# Patient Record
Sex: MALE | Race: WHITE | NOT HISPANIC OR LATINO | Employment: FULL TIME | URBAN - METROPOLITAN AREA
[De-identification: names, ages, dates, MRNs, and addresses within clinical notes are randomized per-mention and may not be internally consistent; named-entity substitution may affect disease eponyms.]

---

## 2019-07-30 ENCOUNTER — HOSPITAL ENCOUNTER (EMERGENCY)
Facility: HOSPITAL | Age: 24
Discharge: HOME | End: 2019-07-31
Admitting: EMERGENCY MEDICINE

## 2019-07-30 VITALS
DIASTOLIC BLOOD PRESSURE: 79 MMHG | OXYGEN SATURATION: 96 % | HEART RATE: 114 BPM | BODY MASS INDEX: 27.65 KG/M2 | SYSTOLIC BLOOD PRESSURE: 117 MMHG | RESPIRATION RATE: 18 BRPM | TEMPERATURE: 99.7 F | HEIGHT: 67 IN | WEIGHT: 176.15 LBS

## 2019-07-30 DIAGNOSIS — J01.00 ACUTE NON-RECURRENT MAXILLARY SINUSITIS: Primary | ICD-10-CM

## 2019-07-30 PROCEDURE — 99282 EMERGENCY DEPT VISIT SF MDM: CPT

## 2019-07-31 ENCOUNTER — APPOINTMENT (OUTPATIENT)
Dept: GENERAL RADIOLOGY | Facility: HOSPITAL | Age: 24
End: 2019-07-31

## 2019-07-31 PROCEDURE — 71046 X-RAY EXAM CHEST 2 VIEWS: CPT

## 2019-07-31 RX ORDER — BROMPHENIRAMINE MALEATE, PSEUDOEPHEDRINE HYDROCHLORIDE, AND DEXTROMETHORPHAN HYDROBROMIDE 2; 30; 10 MG/5ML; MG/5ML; MG/5ML
5 SYRUP ORAL 4 TIMES DAILY PRN
Qty: 118 ML | Refills: 0 | Status: SHIPPED | OUTPATIENT
Start: 2019-07-31 | End: 2020-02-07

## 2019-07-31 RX ORDER — AMOXICILLIN 875 MG/1
875 TABLET, COATED ORAL 2 TIMES DAILY
Qty: 14 TABLET | Refills: 0 | Status: SHIPPED | OUTPATIENT
Start: 2019-07-31 | End: 2020-02-07

## 2020-02-07 ENCOUNTER — HOSPITAL ENCOUNTER (EMERGENCY)
Facility: HOSPITAL | Age: 25
Discharge: HOME | End: 2020-02-07
Admitting: EMERGENCY MEDICINE

## 2020-02-07 VITALS
DIASTOLIC BLOOD PRESSURE: 73 MMHG | OXYGEN SATURATION: 99 % | TEMPERATURE: 98.1 F | WEIGHT: 163.14 LBS | SYSTOLIC BLOOD PRESSURE: 118 MMHG | HEART RATE: 107 BPM | BODY MASS INDEX: 25.61 KG/M2 | HEIGHT: 67 IN | RESPIRATION RATE: 20 BRPM

## 2020-02-07 DIAGNOSIS — J02.0 STREP PHARYNGITIS: Primary | ICD-10-CM

## 2020-02-07 LAB
FLUAV SUBTYP SPEC NAA+PROBE: NOT DETECTED
FLUBV RNA ISLT QL NAA+PROBE: NOT DETECTED
S PYO AG THROAT QL: POSITIVE

## 2020-02-07 PROCEDURE — 87502 INFLUENZA DNA AMP PROBE: CPT

## 2020-02-07 PROCEDURE — 87651 STREP A DNA AMP PROBE: CPT

## 2020-02-07 PROCEDURE — 99283 EMERGENCY DEPT VISIT LOW MDM: CPT

## 2020-02-07 RX ORDER — AMOXICILLIN 875 MG/1
875 TABLET, COATED ORAL 2 TIMES DAILY
Qty: 20 TABLET | Refills: 0 | Status: SHIPPED | OUTPATIENT
Start: 2020-02-07 | End: 2020-02-17

## 2020-02-08 NOTE — ED PROVIDER NOTES
Subjective   Patient is a 24-year-old white male with no significant medical history who presents today with complaints of cough congestion sore throat fever chills and body aches.  He states his symptoms started 2 days ago.  Denies any chest pain or abdominal pain.  He denies any shortness of breath.  He does report nausea with a few episodes of vomiting.  He denies any diarrhea.  He denies any ill contacts or recent travel.          Review of Systems   Constitutional: Positive for chills and fever.   HENT: Positive for congestion, rhinorrhea and sore throat.    Respiratory: Positive for cough. Negative for shortness of breath.    Cardiovascular: Negative for chest pain.   Gastrointestinal: Positive for nausea and vomiting. Negative for abdominal pain and diarrhea.   Genitourinary: Negative for decreased urine volume.   Skin: Negative for rash.       Past Medical History:   Diagnosis Date   • Anxiety    • Depression        No Known Allergies    Past Surgical History:   Procedure Laterality Date   • TONSILLECTOMY         History reviewed. No pertinent family history.    Social History     Socioeconomic History   • Marital status: Single     Spouse name: Not on file   • Number of children: Not on file   • Years of education: Not on file   • Highest education level: Not on file   Tobacco Use   • Smoking status: Current Every Day Smoker     Packs/day: 2.00     Years: 13.00     Pack years: 26.00     Types: Cigarettes   • Smokeless tobacco: Never Used   Substance and Sexual Activity   • Alcohol use: Not Currently   • Drug use: Never   • Sexual activity: Defer           Objective   Physical Exam   Constitutional: He appears well-developed.   Vital signs and triage nurse note reviewed.  Constitutional: Awake, alert; well-developed and well-nourished. No acute distress is noted.  HEENT: Normocephalic, atraumatic; pupils are PERRL with intact EOM; oropharynx is erythematous and moist without exudate.  No drooling or pooling  of oral secretions.  Uvula is midline.  Neck: Supple, full range of motion without pain; anterior cervical lymphadenopathy. Normal phonation.  Cardiovascular: Regular rate and rhythm, normal S1-S2.  No murmur noted.  Pulmonary: Respiratory effort regular nonlabored, breath sounds clear to auscultation all fields.  Abdomen: Soft, nontender, nondistended with normoactive bowel sounds; no rebound or guarding.  Musculoskeletal: Independent range of motion of all extremities with no palpable tenderness or edema.  Neuro: Alert oriented x3, speech is clear and appropriate, GCS 15.    Skin: Flesh tone, warm, dry, intact; no erythematous or petechial rash or lesion.        Procedures           ED Course                                               MDM  Number of Diagnoses or Management Options  Diagnosis management comments: Comorbidities: None  Differentials: Strep, flu, viral illness;this list is not all inclusive and does not constitute the entirety of considered causes  Discussion with provider:  Radiology interpretation: X-rays reviewed by me and interpreted by radiologist: Not warranted  Lab interpretation: Labs viewed by me significant for: As above    Patient had flu and strep swab obtained.    Diagnosis and treatment plan discussed with patient.  Patient agreeable to plan.   I discussed findings with patient who voices understanding of discharge instructions, signs and symptoms requiring return to ED; discharged improved and in stable condition with follow up for re-evaluation.  Prescription for amoxicillin.         Amount and/or Complexity of Data Reviewed  Clinical lab tests: ordered and reviewed    Patient Progress  Patient progress: stable      Final diagnoses:   Strep pharyngitis            Rena Sampson, SRINIVAS  02/07/20 2040

## 2020-02-08 NOTE — DISCHARGE INSTRUCTIONS
Take medication as prescribed.  Drink plenty fluids.  Use Tylenol ibuprofen for pain or fever.  Warm salt water gargles.  Chloraseptic throat spray or Cepacol lozenges.  Change your toothbrush in 2 days.  Follow-up with primary care physician as needed.  Return for new or worsening symptoms.

## 2020-03-11 ENCOUNTER — HOSPITAL ENCOUNTER (EMERGENCY)
Facility: HOSPITAL | Age: 25
Discharge: HOME | End: 2020-03-12
Admitting: EMERGENCY MEDICINE

## 2020-03-11 DIAGNOSIS — M54.6 BILATERAL THORACIC BACK PAIN, UNSPECIFIED CHRONICITY: Primary | ICD-10-CM

## 2020-03-11 PROCEDURE — 99282 EMERGENCY DEPT VISIT SF MDM: CPT

## 2020-03-12 VITALS
HEIGHT: 67 IN | OXYGEN SATURATION: 98 % | HEART RATE: 69 BPM | DIASTOLIC BLOOD PRESSURE: 77 MMHG | SYSTOLIC BLOOD PRESSURE: 111 MMHG | TEMPERATURE: 98.5 F | BODY MASS INDEX: 25.99 KG/M2 | RESPIRATION RATE: 18 BRPM | WEIGHT: 165.57 LBS
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
## 2020-04-20 ENCOUNTER — OFFICE VISIT (OUTPATIENT)
Dept: FAMILY MEDICINE CLINIC | Facility: CLINIC | Age: 25
End: 2020-04-20

## 2020-04-20 ENCOUNTER — LAB (OUTPATIENT)
Dept: FAMILY MEDICINE CLINIC | Facility: CLINIC | Age: 25
End: 2020-04-20

## 2020-04-20 VITALS
DIASTOLIC BLOOD PRESSURE: 82 MMHG | BODY MASS INDEX: 25.74 KG/M2 | TEMPERATURE: 98.2 F | SYSTOLIC BLOOD PRESSURE: 127 MMHG | OXYGEN SATURATION: 98 % | HEIGHT: 67 IN | WEIGHT: 164 LBS | HEART RATE: 86 BPM

## 2020-04-20 DIAGNOSIS — R63.1 POLYDIPSIA: ICD-10-CM

## 2020-04-20 DIAGNOSIS — R35.89 POLYURIA: ICD-10-CM

## 2020-04-20 DIAGNOSIS — R20.2 NUMBNESS AND TINGLING: ICD-10-CM

## 2020-04-20 DIAGNOSIS — F41.9 ANXIETY: ICD-10-CM

## 2020-04-20 DIAGNOSIS — F32.0 CURRENT MILD EPISODE OF MAJOR DEPRESSIVE DISORDER WITHOUT PRIOR EPISODE (HCC): ICD-10-CM

## 2020-04-20 DIAGNOSIS — S22.080A COMPRESSION FRACTURE OF T11 VERTEBRA, INITIAL ENCOUNTER (HCC): Primary | ICD-10-CM

## 2020-04-20 DIAGNOSIS — R20.0 NUMBNESS AND TINGLING: ICD-10-CM

## 2020-04-20 LAB
ALBUMIN SERPL-MCNC: 4.6 G/DL (ref 3.5–5.2)
ALBUMIN/GLOB SERPL: 1.5 G/DL
ALP SERPL-CCNC: 68 U/L (ref 39–117)
ALT SERPL W P-5'-P-CCNC: 36 U/L (ref 1–41)
ANION GAP SERPL CALCULATED.3IONS-SCNC: 12.8 MMOL/L (ref 5–15)
AST SERPL-CCNC: 18 U/L (ref 1–40)
BASOPHILS # BLD AUTO: 0.03 10*3/MM3 (ref 0–0.2)
BASOPHILS NFR BLD AUTO: 0.3 % (ref 0–1.5)
BILIRUB SERPL-MCNC: 1 MG/DL (ref 0.2–1.2)
BUN BLD-MCNC: 8 MG/DL (ref 6–20)
BUN/CREAT SERPL: 9.6 (ref 7–25)
CALCIUM SPEC-SCNC: 9.7 MG/DL (ref 8.6–10.5)
CHLORIDE SERPL-SCNC: 100 MMOL/L (ref 98–107)
CO2 SERPL-SCNC: 26.2 MMOL/L (ref 22–29)
CREAT BLD-MCNC: 0.83 MG/DL (ref 0.76–1.27)
DEPRECATED RDW RBC AUTO: 39.6 FL (ref 37–54)
EOSINOPHIL # BLD AUTO: 0.06 10*3/MM3 (ref 0–0.4)
EOSINOPHIL NFR BLD AUTO: 0.7 % (ref 0.3–6.2)
ERYTHROCYTE [DISTWIDTH] IN BLOOD BY AUTOMATED COUNT: 12.5 % (ref 12.3–15.4)
GFR SERPL CREATININE-BSD FRML MDRD: 112 ML/MIN/1.73
GLOBULIN UR ELPH-MCNC: 3 GM/DL
GLUCOSE BLD-MCNC: 123 MG/DL (ref 65–99)
HBA1C MFR BLD: 5.4 % (ref 3.5–5.6)
HCT VFR BLD AUTO: 44.6 % (ref 37.5–51)
HGB BLD-MCNC: 15.6 G/DL (ref 13–17.7)
IMM GRANULOCYTES # BLD AUTO: 0.02 10*3/MM3 (ref 0–0.05)
IMM GRANULOCYTES NFR BLD AUTO: 0.2 % (ref 0–0.5)
LYMPHOCYTES # BLD AUTO: 2.39 10*3/MM3 (ref 0.7–3.1)
LYMPHOCYTES NFR BLD AUTO: 27.3 % (ref 19.6–45.3)
MCH RBC QN AUTO: 30.6 PG (ref 26.6–33)
MCHC RBC AUTO-ENTMCNC: 35 G/DL (ref 31.5–35.7)
MCV RBC AUTO: 87.6 FL (ref 79–97)
MONOCYTES # BLD AUTO: 0.58 10*3/MM3 (ref 0.1–0.9)
MONOCYTES NFR BLD AUTO: 6.6 % (ref 5–12)
NEUTROPHILS # BLD AUTO: 5.68 10*3/MM3 (ref 1.7–7)
NEUTROPHILS NFR BLD AUTO: 64.9 % (ref 42.7–76)
NRBC BLD AUTO-RTO: 0 /100 WBC (ref 0–0.2)
PLATELET # BLD AUTO: 295 10*3/MM3 (ref 140–450)
PMV BLD AUTO: 10.4 FL (ref 6–12)
POTASSIUM BLD-SCNC: 4.1 MMOL/L (ref 3.5–5.2)
PROT SERPL-MCNC: 7.6 G/DL (ref 6–8.5)
RBC # BLD AUTO: 5.09 10*6/MM3 (ref 4.14–5.8)
SODIUM BLD-SCNC: 139 MMOL/L (ref 136–145)
VIT B12 BLD-MCNC: 395 PG/ML (ref 211–946)
WBC NRBC COR # BLD: 8.76 10*3/MM3 (ref 3.4–10.8)

## 2020-04-20 PROCEDURE — 82652 VIT D 1 25-DIHYDROXY: CPT | Performed by: NURSE PRACTITIONER

## 2020-04-20 PROCEDURE — 99204 OFFICE O/P NEW MOD 45 MIN: CPT | Performed by: NURSE PRACTITIONER

## 2020-04-20 PROCEDURE — 36415 COLL VENOUS BLD VENIPUNCTURE: CPT | Performed by: NURSE PRACTITIONER

## 2020-04-20 PROCEDURE — 80053 COMPREHEN METABOLIC PANEL: CPT | Performed by: NURSE PRACTITIONER

## 2020-04-20 PROCEDURE — 82607 VITAMIN B-12: CPT | Performed by: NURSE PRACTITIONER

## 2020-04-20 PROCEDURE — 83036 HEMOGLOBIN GLYCOSYLATED A1C: CPT | Performed by: NURSE PRACTITIONER

## 2020-04-20 PROCEDURE — 85025 COMPLETE CBC W/AUTO DIFF WBC: CPT | Performed by: NURSE PRACTITIONER

## 2020-04-20 RX ORDER — ESCITALOPRAM OXALATE 10 MG/1
10 TABLET ORAL DAILY
Qty: 30 TABLET | Refills: 3 | Status: SHIPPED | OUTPATIENT
Start: 2020-04-20 | End: 2020-10-26

## 2020-04-20 RX ORDER — HYDROXYZINE HYDROCHLORIDE 25 MG/1
25 TABLET, FILM COATED ORAL EVERY 6 HOURS PRN
Qty: 60 TABLET | Refills: 1 | Status: SHIPPED | OUTPATIENT
Start: 2020-04-20
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
## 2020-04-22 LAB — 1,25(OH)2D3 SERPL-MCNC: 27.4 PG/ML (ref 19.9–79.3)

## 2020-04-29 ENCOUNTER — OFFICE VISIT (OUTPATIENT)
Dept: NEUROSURGERY | Facility: CLINIC | Age: 25
End: 2020-04-29

## 2020-04-29 VITALS
SYSTOLIC BLOOD PRESSURE: 137 MMHG | HEART RATE: 76 BPM | DIASTOLIC BLOOD PRESSURE: 74 MMHG | BODY MASS INDEX: 26.06 KG/M2 | WEIGHT: 166 LBS | HEIGHT: 67 IN

## 2020-04-29 DIAGNOSIS — S22.088A OTHER CLOSED FRACTURE OF ELEVENTH THORACIC VERTEBRA, INITIAL ENCOUNTER (HCC): ICD-10-CM

## 2020-04-29 DIAGNOSIS — M54.6 PAIN IN THORACIC SPINE: Primary | ICD-10-CM

## 2020-04-29 PROCEDURE — 99203 OFFICE O/P NEW LOW 30 MIN: CPT | Performed by: NEUROLOGICAL SURGERY

## 2020-04-29 RX ORDER — GABAPENTIN 300 MG/1
300 CAPSULE ORAL NIGHTLY
Qty: 120 CAPSULE | Refills: 2 | Status: SHIPPED | OUTPATIENT
Start: 2020-04-29
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
## 2020-06-01 PROBLEM — S22.079D: Status: ACTIVE | Noted: 2020-06-01

## 2020-06-08 ENCOUNTER — HOSPITAL ENCOUNTER (EMERGENCY)
Facility: HOSPITAL | Age: 25
Discharge: HOME | End: 2020-06-08
Attending: EMERGENCY MEDICINE | Admitting: EMERGENCY MEDICINE

## 2020-06-08 VITALS
WEIGHT: 188.27 LBS | RESPIRATION RATE: 16 BRPM | DIASTOLIC BLOOD PRESSURE: 87 MMHG | OXYGEN SATURATION: 98 % | HEIGHT: 67 IN | BODY MASS INDEX: 29.55 KG/M2 | TEMPERATURE: 98.2 F | HEART RATE: 71 BPM | SYSTOLIC BLOOD PRESSURE: 124 MMHG

## 2020-06-08 DIAGNOSIS — H60.332 ACUTE SWIMMER'S EAR OF LEFT SIDE: Primary | ICD-10-CM

## 2020-06-08 DIAGNOSIS — R42 VERTIGO: ICD-10-CM

## 2020-06-08 PROCEDURE — 99282 EMERGENCY DEPT VISIT SF MDM: CPT

## 2020-06-08 RX ORDER — CIPROFLOXACIN AND DEXAMETHASONE 3; 1 MG/ML; MG/ML
4 SUSPENSION/ DROPS AURICULAR (OTIC) 2 TIMES DAILY
Qty: 7.5 ML | Refills: 0 | Status: SHIPPED | OUTPATIENT
Start: 2020-06-08

## 2020-06-08 RX ORDER — TRAMADOL HYDROCHLORIDE 50 MG/1
50 TABLET ORAL EVERY 6 HOURS PRN
Qty: 10 TABLET | Refills: 0 | Status: SHIPPED | OUTPATIENT
Start: 2020-06-08

## 2020-06-08 RX ORDER — MECLIZINE HYDROCHLORIDE 25 MG/1
25 TABLET ORAL 3 TIMES DAILY PRN
Qty: 15 TABLET | Refills: 0 | Status: SHIPPED | OUTPATIENT
Start: 2020-06-08

## 2020-06-08 NOTE — ED PROVIDER NOTES
Subjective   24-year-old male complaining of pain in his left ear.  He states his been swimming a lot over the last several days.  He reports he has had no definite fever or chills but felt hot.  He reports no nausea vomiting or diarrhea.  He states he had some vertigo earlier this evening          Review of Systems   HENT: Positive for ear discharge and ear pain. Negative for nosebleeds, sore throat and trouble swallowing.    Hematological: Does not bruise/bleed easily.       Past Medical History:   Diagnosis Date   • Anxiety    • Depression    • History of suicidal ideation    • Mid back pain    • Recovering alcoholic (CMS/HCC)        No Known Allergies    Past Surgical History:   Procedure Laterality Date   • TONSILLECTOMY     • TOOTH EXTRACTION         Family History   Problem Relation Age of Onset   • Diabetes Maternal Grandfather    • Hypertension Maternal Grandfather    • Stroke Mother    • Hypertension Mother        Social History     Socioeconomic History   • Marital status: Single     Spouse name: Not on file   • Number of children: Not on file   • Years of education: Not on file   • Highest education level: Not on file   Tobacco Use   • Smoking status: Current Every Day Smoker     Packs/day: 2.00     Years: 13.00     Pack years: 26.00     Types: Cigarettes   • Smokeless tobacco: Current User     Types: Chew   Substance and Sexual Activity   • Alcohol use: Not Currently   • Drug use: Never   • Sexual activity: Defer           Objective   Physical Exam  Alert Beatriz Coma Scale 15   HEENT: Pupils equal and reactive to light. Conjunctivae are not injected. normal tympanic membrane on the right.  There is some mild erythema noted on the left appears to be reactive and there is good mobility.  The patient does have erythema and yellow drainage noted in the canal on the left.. Oropharynx and nares are normal.   Neck: Supple. Midline trachea. No JVD. No goiter.   Chest: Clear and equal breath sounds bilaterally  regular rate and rhythm without murmur or rub.   Abdomen: Positive bowel sounds nontender nondistended. No rebound or peritoneal signs. No CVA tenderness.   Extremities no clubbing cyanosis or edema motor sensory exam is normal the full range of motion is intact   skin: Warm and dry, no rashes or petechia.   Lymphatic: No regional lymphadenopathy. No calf pain, swelling or Yogesh's sign    Procedures           ED Course                                           MDM  Number of Diagnoses or Management Options  Risk of Complications, Morbidity, and/or Mortality  Presenting problems: high  Diagnostic procedures: high  Management options: high  General comments: The patient was given a prescription for Ciprodex, meclizine, tramadol.  The patient was advised to elevate his head and avoid rapid head movements for the next 3 days.  He is encouraged to follow with primary care provider.    Patient Progress  Patient progress: improved      Final diagnoses:   Acute swimmer's ear of left side   Vertigo            John Sharpe MD  06/08/20 0435

## 2020-08-07 ENCOUNTER — HOSPITAL ENCOUNTER (EMERGENCY)
Facility: HOSPITAL | Age: 25
Discharge: HOME | End: 2020-08-07
Attending: EMERGENCY MEDICINE | Admitting: EMERGENCY MEDICINE

## 2020-08-07 ENCOUNTER — APPOINTMENT (OUTPATIENT)
Dept: ULTRASOUND IMAGING | Facility: HOSPITAL | Age: 25
End: 2020-08-07

## 2020-08-07 VITALS
WEIGHT: 183.64 LBS | BODY MASS INDEX: 28.82 KG/M2 | HEIGHT: 67 IN | TEMPERATURE: 97.8 F | SYSTOLIC BLOOD PRESSURE: 120 MMHG | OXYGEN SATURATION: 99 % | DIASTOLIC BLOOD PRESSURE: 78 MMHG | RESPIRATION RATE: 18 BRPM | HEART RATE: 75 BPM

## 2020-08-07 DIAGNOSIS — N50.819 TESTICLE PAIN: Primary | ICD-10-CM

## 2020-08-07 LAB
BILIRUB UR QL STRIP: NEGATIVE
CLARITY UR: CLEAR
COLOR UR: YELLOW
GLUCOSE UR STRIP-MCNC: NEGATIVE MG/DL
HGB UR QL STRIP.AUTO: NEGATIVE
KETONES UR QL STRIP: NEGATIVE
LEUKOCYTE ESTERASE UR QL STRIP.AUTO: NEGATIVE
NITRITE UR QL STRIP: NEGATIVE
PH UR STRIP.AUTO: 6 [PH] (ref 5–8)
PROT UR QL STRIP: NEGATIVE
SP GR UR STRIP: 1.01 (ref 1–1.03)
UROBILINOGEN UR QL STRIP: NORMAL

## 2020-08-07 PROCEDURE — 99283 EMERGENCY DEPT VISIT LOW MDM: CPT

## 2020-08-07 PROCEDURE — 76870 US EXAM SCROTUM: CPT

## 2020-08-07 PROCEDURE — 81003 URINALYSIS AUTO W/O SCOPE: CPT | Performed by: EMERGENCY MEDICINE

## 2020-08-07 NOTE — ED PROVIDER NOTES
Subjective   24-year-old female with onset of moderate right scrotal pain, worse with movement since 9 PM.  Nontraumatic, no dysuria or urethral discharge.  No flank or abdominal pain.  No fever.          Review of Systems   Genitourinary:        As per HPI   All other systems reviewed and are negative.      Past Medical History:   Diagnosis Date   • Anxiety    • Depression    • History of suicidal ideation    • Mid back pain    • Recovering alcoholic (CMS/HCC)        No Known Allergies    Past Surgical History:   Procedure Laterality Date   • TONSILLECTOMY     • TOOTH EXTRACTION         Family History   Problem Relation Age of Onset   • Diabetes Maternal Grandfather    • Hypertension Maternal Grandfather    • Stroke Mother    • Hypertension Mother        Social History     Socioeconomic History   • Marital status: Single     Spouse name: Not on file   • Number of children: Not on file   • Years of education: Not on file   • Highest education level: Not on file   Tobacco Use   • Smoking status: Current Every Day Smoker     Packs/day: 2.00     Years: 13.00     Pack years: 26.00     Types: Cigarettes   • Smokeless tobacco: Current User     Types: Chew   Substance and Sexual Activity   • Alcohol use: Not Currently   • Drug use: Never   • Sexual activity: Defer           Objective   Physical Exam   Constitutional: He is oriented to person, place, and time. He appears well-developed and well-nourished.   HENT:   Head: Normocephalic and atraumatic.   Cardiovascular: Intact distal pulses.   Abdominal: Soft. There is no tenderness.   Genitourinary:   Genitourinary Comments: Normal  inspection, circumcised penis, no lesions, no adenopathy.  Right hemiscrotal tenderness to palpation, no abnormal lie.  Patient examined while standing, no hernia appreciated   Musculoskeletal: Normal range of motion. He exhibits no edema or tenderness.   Neurological: He is alert and oriented to person, place, and time.   Skin: Skin is warm  and dry. Capillary refill takes less than 2 seconds.   Psychiatric: He has a normal mood and affect. His behavior is normal.       Procedures           ED Course                                           MDM  Number of Diagnoses or Management Options  Testicle pain:   Diagnosis management comments: Results for orders placed or performed during the hospital encounter of 08/07/20  -Urinalysis With Culture If Indicated - Urine, Clean Catch       Result                      Value             Ref Range           Color, UA                   Yellow            Yellow, Straw       Appearance, UA              Clear             Clear               pH, UA                      6.0               5.0 - 8.0           Specific [place], UA        1.010             1.005 - 1.030       Glucose, UA                 Negative          Negative            Ketones, UA                 Negative          Negative            Bilirubin, UA               Negative          Negative            Blood, UA                   Negative          Negative            Protein, UA                 Negative          Negative            Leuk Esterase, UA           Negative          Negative            Nitrite, UA                 Negative          Negative            Urobilinogen, UA            0.2 E.U./dL       0.2 - 1.0 E.*    Us Scrotum & Testicles    Result Date: 8/6/2020  1. Normal testicles without torsion. 2. Small bilateral hydroceles. Electronically signed by:  Jim Corrigan M.D.  8/6/2020 11:32 PM    Patient appears well, pain mild, urology follow-up if does not resolve.       Amount and/or Complexity of Data Reviewed  Clinical lab tests: reviewed  Tests in the radiology section of CPT®: reviewed        Final diagnoses:   Testicle pain            Matthew King MD  08/07/20 0147

## 2020-08-07 NOTE — DISCHARGE INSTRUCTIONS
Return for any significant increase in pain, any redness or swelling or fever.  Otherwise take over-the-counter ibuprofen as needed for pain, follow-up with urologist.

## 2020-10-26 DIAGNOSIS — F32.0 CURRENT MILD EPISODE OF MAJOR DEPRESSIVE DISORDER WITHOUT PRIOR EPISODE (HCC): ICD-10-CM

## 2020-10-26 DIAGNOSIS — F41.9 ANXIETY: ICD-10-CM

## 2020-10-26 RX ORDER — ESCITALOPRAM OXALATE 10 MG/1
TABLET ORAL
Qty: 30 TABLET | Refills: 3 | Status: SHIPPED | OUTPATIENT
Start: 2020-10-26

## 2022-01-08 ENCOUNTER — HOSPITAL ENCOUNTER (EMERGENCY)
Facility: HOSPITAL | Age: 27
Discharge: HOME | End: 2022-01-09
Attending: EMERGENCY MEDICINE | Admitting: EMERGENCY MEDICINE

## 2022-01-08 VITALS
HEIGHT: 67 IN | WEIGHT: 172.84 LBS | OXYGEN SATURATION: 99 % | BODY MASS INDEX: 27.13 KG/M2 | SYSTOLIC BLOOD PRESSURE: 118 MMHG | HEART RATE: 76 BPM | RESPIRATION RATE: 16 BRPM | TEMPERATURE: 98.2 F | DIASTOLIC BLOOD PRESSURE: 74 MMHG

## 2022-01-08 DIAGNOSIS — U07.1 COVID: Primary | ICD-10-CM

## 2022-01-08 LAB — SARS-COV-2 RNA PNL SPEC NAA+PROBE: DETECTED

## 2022-01-08 PROCEDURE — 99283 EMERGENCY DEPT VISIT LOW MDM: CPT

## 2022-01-08 PROCEDURE — U0003 INFECTIOUS AGENT DETECTION BY NUCLEIC ACID (DNA OR RNA); SEVERE ACUTE RESPIRATORY SYNDROME CORONAVIRUS 2 (SARS-COV-2) (CORONAVIRUS DISEASE [COVID-19]), AMPLIFIED PROBE TECHNIQUE, MAKING USE OF HIGH THROUGHPUT TECHNOLOGIES AS DESCRIBED BY CMS-2020-01-R: HCPCS | Performed by: EMERGENCY MEDICINE

## 2022-01-08 PROCEDURE — C9803 HOPD COVID-19 SPEC COLLECT: HCPCS | Performed by: EMERGENCY MEDICINE

## 2022-01-09 NOTE — ED PROVIDER NOTES
Subjective   History of Present Illness  Fever  26-year-old male states he has fever and achiness today.  States he was exposed to Covid earlier in the week.  He reports no shortness of breath or vomiting or diarrhea.  Review of Systems   Constitutional: Positive for fever.   HENT: Positive for congestion.    Respiratory: Negative.    Cardiovascular: Negative.    Gastrointestinal: Negative.    Musculoskeletal: Positive for myalgias.   Neurological: Negative.        Past Medical History:   Diagnosis Date   • Anxiety    • Depression    • History of suicidal ideation    • Mid back pain    • Recovering alcoholic (CMS/HCC)        No Known Allergies    Past Surgical History:   Procedure Laterality Date   • TONSILLECTOMY     • TOOTH EXTRACTION         Family History   Problem Relation Age of Onset   • Diabetes Maternal Grandfather    • Hypertension Maternal Grandfather    • Stroke Mother    • Hypertension Mother        Social History     Socioeconomic History   • Marital status: Single   Tobacco Use   • Smoking status: Current Every Day Smoker     Packs/day: 2.00     Years: 13.00     Pack years: 26.00     Types: Cigarettes   • Smokeless tobacco: Current User     Types: Chew   Substance and Sexual Activity   • Alcohol use: Not Currently   • Drug use: Never   • Sexual activity: Defer     Prior to Admission medications    Medication Sig Start Date End Date Taking? Authorizing Provider   ciprofloxacin-dexamethasone (CIPRODEX) 0.3-0.1 % otic suspension Administer 4 drops into the left ear 2 (Two) Times a Day. 6/8/20   John Sharpe MD   diclofenac (VOLTAREN) 50 MG EC tablet One tablet by mouth twice daily as needed for pain 4/20/20   Ana Balderrama APRN   escitalopram (LEXAPRO) 10 MG tablet TAKE 1 TABLET BY MOUTH EVERY DAY 10/26/20   Ana Balderrama APRN   gabapentin (NEURONTIN) 300 MG capsule Take 1 capsule by mouth Every Night. 4/29/20   Zoltan Marlow MD   gabapentin (NEURONTIN) 300 MG capsule Take 1 capsule  "by mouth Every Night. 6/1/20   Zoltan Marlow MD   hydrOXYzine (ATARAX) 25 MG tablet Take 1 tablet by mouth Every 6 (Six) Hours As Needed for Anxiety. 4/20/20   Ana Balderrama APRN   meclizine (ANTIVERT) 25 MG tablet Take 1 tablet by mouth 3 (Three) Times a Day As Needed for Dizziness. 6/8/20   John Sharpe MD   traMADol (ULTRAM) 50 MG tablet Take 1 tablet by mouth Every 6 (Six) Hours As Needed for Moderate Pain  or Severe Pain . 6/8/20   John Sharpe MD     /74 (BP Location: Right arm, Patient Position: Sitting)   Pulse 76   Temp 98.2 °F (36.8 °C) (Oral)   Resp 16   Ht 170.2 cm (67\")   Wt 78.4 kg (172 lb 13.5 oz)   SpO2 99%   BMI 27.07 kg/m²   I examined the patient using the appropriate personal protective equipment.          Objective   Physical Exam  General: Well-appearing, no acute distress  Psych: Oriented, pleasant affect  Respirations: Clear, nonlabored respirations  Skin: No rash, normal color  Procedures           ED Course                 Results for orders placed or performed during the hospital encounter of 01/08/22   COVID-19,CEPHEID/SHAWN,COR/JOSE/PAD/KYM IN-HOUSE(OR EMERGENT/ADD-ON),NP SWAB IN TRANSPORT MEDIA 3-4 HR TAT, RT-PCR - Swab, Nasopharynx    Specimen: Nasopharynx; Swab   Result Value Ref Range    COVID19 Detected (C) Not Detected - Ref. Range                                        MDM  Patient presents with flulike symptoms and was positive for Covid after having had a Covid exposure.  He does not have high risk for severe disease.  He is discharged good condition he was given warning signs for return.  His oxygen saturation is 99% and he has no respiratory distress.  Final diagnoses:   COVID       ED Disposition  ED Disposition     ED Disposition Condition Comment    Discharge Stable           Ana Balderrama APRN  [address]  [place] [address] [phone]      As needed         Medication List      No changes were made to your " prescriptions during this visit.          Rolando Arango MD  01/08/22 2030

## 2022-01-09 NOTE — DISCHARGE INSTRUCTIONS
Rest, drink plenty of fluids, initiate home quarantine, Tylenol for achiness, return for increased shortness of breath, persistent vomiting or any other concerns

## 2022-01-09 NOTE — ED NOTES
"Pt stated yesterday he woke up with a 101 degree fever. Pt stated he's also had generalized body aches. Pt denies any n/v/d. Pt stated he has a cough \"but that's not a new thing because I smoke.\"     Keegan Champagne, RN  01/08/22 2011    "

## 2022-11-15 ENCOUNTER — HOSPITAL ENCOUNTER (EMERGENCY)
Facility: HOSPITAL | Age: 27
Discharge: HOME | End: 2022-11-15
Attending: EMERGENCY MEDICINE | Admitting: EMERGENCY MEDICINE

## 2022-11-15 VITALS
SYSTOLIC BLOOD PRESSURE: 112 MMHG | RESPIRATION RATE: 16 BRPM | HEIGHT: 67 IN | HEART RATE: 73 BPM | BODY MASS INDEX: 28.55 KG/M2 | WEIGHT: 181.88 LBS | DIASTOLIC BLOOD PRESSURE: 75 MMHG | TEMPERATURE: 97.7 F | OXYGEN SATURATION: 97 %

## 2022-11-15 DIAGNOSIS — K08.89 PAIN, DENTAL: Primary | ICD-10-CM

## 2022-11-15 PROCEDURE — 99282 EMERGENCY DEPT VISIT SF MDM: CPT

## 2022-11-15 RX ORDER — AMOXICILLIN 500 MG/1
500 CAPSULE ORAL 3 TIMES DAILY
Qty: 30 CAPSULE | Refills: 0 | Status: SHIPPED | OUTPATIENT
Start: 2022-11-15 | End: 2022-11-25

## 2022-11-16 NOTE — ED PROVIDER NOTES
Subjective   History of Present Illness  26-year-old male with 2 weeks of nontraumatic moderate right dental/jaw pain, worse with chewing, no fevers, no congestion or ear pain.        Review of Systems   Constitutional: Negative.    HENT:        As per HPI       Past Medical History:   Diagnosis Date   • Anxiety    • Depression    • History of suicidal ideation    • Mid back pain    • Recovering alcoholic (CMS/HCC)        No Known Allergies    Past Surgical History:   Procedure Laterality Date   • TONSILLECTOMY     • TOOTH EXTRACTION         Family History   Problem Relation Age of Onset   • Diabetes Maternal Grandfather    • Hypertension Maternal Grandfather    • Stroke Mother    • Hypertension Mother        Social History     Socioeconomic History   • Marital status: Single   Tobacco Use   • Smoking status: Every Day     Packs/day: 2.00     Years: 13.00     Pack years: 26.00     Types: Cigarettes   • Smokeless tobacco: Current     Types: Chew   Substance and Sexual Activity   • Alcohol use: Not Currently   • Drug use: Never   • Sexual activity: Defer           Objective   Physical Exam  Constitutional:       Appearance: Normal appearance.   HENT:      Head: Normocephalic and atraumatic.      Comments: No facial swelling     Right Ear: Tympanic membrane normal.      Mouth/Throat:      Mouth: Mucous membranes are moist.      Pharynx: Oropharynx is clear.      Comments: No gingival abscess seen, oropharynx clear, no trismus, no sublingual swelling  Musculoskeletal:      Cervical back: Normal range of motion and neck supple.   Lymphadenopathy:      Cervical: No cervical adenopathy.   Neurological:      Mental Status: He is alert.         Procedures           ED Course                                           MDM  Number of Diagnoses or Management Options  Pain, dental  Diagnosis management comments: Will place empirically on amoxicillin given pain with chewing though no drainable abscess seen, will refer to dentist.   Pain does not appear to be related to the TMJ, ear exam is normal.      Final diagnoses:   Pain, dental       ED Disposition  ED Disposition     ED Disposition   Discharge    Condition   Stable    Comment   --               Follow up with dentist via referral             Medication List      New Prescriptions    amoxicillin 500 MG capsule  Commonly known as: AMOXIL  Take 1 capsule by mouth 3 (Three) Times a Day for 10 days.           Where to Get Your Medications      These medications were sent to [facility]/pharmacy #[id number] - [facility] [place] - [address] [phone] [place] [id number]   [address] [place] [address]    Hours: 24-hours Phone: [phone]   · amoxicillin 500 MG capsule          Matthew King MD  11/15/22 4039

## 2023-03-15 ENCOUNTER — HOSPITAL ENCOUNTER (EMERGENCY)
Facility: HOSPITAL | Age: 28
Discharge: HOME | End: 2023-03-15
Attending: EMERGENCY MEDICINE | Admitting: EMERGENCY MEDICINE

## 2023-03-15 VITALS
BODY MASS INDEX: 27.47 KG/M2 | DIASTOLIC BLOOD PRESSURE: 75 MMHG | RESPIRATION RATE: 18 BRPM | WEIGHT: 175 LBS | TEMPERATURE: 97.8 F | SYSTOLIC BLOOD PRESSURE: 118 MMHG | HEIGHT: 67 IN | HEART RATE: 77 BPM | OXYGEN SATURATION: 97 %

## 2023-03-15 DIAGNOSIS — S61.214A LACERATION OF RIGHT RING FINGER WITHOUT FOREIGN BODY WITHOUT DAMAGE TO NAIL, INITIAL ENCOUNTER: Primary | ICD-10-CM

## 2023-03-15 PROCEDURE — 99282 EMERGENCY DEPT VISIT SF MDM: CPT

## 2023-03-15 PROCEDURE — 0 LIDOCAINE 1 % SOLUTION: Performed by: EMERGENCY MEDICINE

## 2023-03-15 RX ORDER — LIDOCAINE HYDROCHLORIDE 10 MG/ML
10 INJECTION, SOLUTION INFILTRATION; PERINEURAL ONCE
Status: COMPLETED | OUTPATIENT
Start: 2023-03-15 | End: 2023-03-15

## 2023-03-15 RX ADMIN — LIDOCAINE HYDROCHLORIDE 10 ML: 10 INJECTION, SOLUTION INFILTRATION; PERINEURAL at 07:42

## 2023-03-15 NOTE — ED PROVIDER NOTES
Subjective   History of Present Illness  27-year-old male history of anxiety, depression presents for laceration over dorsal surface fourth fifth digit right hand after punching a picture frame 30 minutes prior to arrival.  Unknown last tetanus but states he does not want 1 today regardless.  Denies any numbness or weakness.      Review of Systems   Neurological: Negative for weakness and numbness.       Past Medical History:   Diagnosis Date   • Anxiety    • Depression    • History of suicidal ideation    • Mid back pain    • Recovering alcoholic (CMS/HCC)        No Known Allergies    Past Surgical History:   Procedure Laterality Date   • TONSILLECTOMY     • TOOTH EXTRACTION         Family History   Problem Relation Age of Onset   • Diabetes Maternal Grandfather    • Hypertension Maternal Grandfather    • Stroke Mother    • Hypertension Mother        Social History     Socioeconomic History   • Marital status: Single   Tobacco Use   • Smoking status: Every Day     Packs/day: 2.00     Years: 13.00     Pack years: 26.00     Types: Cigarettes   • Smokeless tobacco: Current     Types: Chew   Substance and Sexual Activity   • Alcohol use: Not Currently   • Drug use: Never   • Sexual activity: Defer           Objective   Physical Exam  Right upper extremity: 2+ radial pulses, cap refill less than 3 seconds each distal finger, sensation intact to light touch.  Strength and range of motion intact in PIP, DIP, MCP joints right hand.  Patient with superficial 1.5 cm laceration over DIP joint that does not extend into the joint when probed.  Patient with slightly deeper laceration between PIP and DIP joint on dorsal fourth digit.  Laceration Repair    Date/Time: 3/15/2023 8:05 AM  Performed by: Josué Hernandez MD  Authorized by: Josué Hernandez MD     Consent:     Consent obtained:  Verbal    Consent given by:  Patient    Risks discussed:  Infection, retained foreign body, pain, need for additional repair, poor cosmetic  "result, tendon damage, vascular damage, poor wound healing and nerve damage    Alternatives discussed:  No treatment  Universal protocol:     Procedure explained and questions answered to patient or proxy's satisfaction: yes      Patient identity confirmed:  Verbally with patient  Anesthesia:     Anesthesia method:  Local infiltration    Local anesthetic:  Lidocaine 1% w/o epi  Laceration details:     Location:  Finger    Finger location:  R ring finger    Length (cm):  1.5  Pre-procedure details:     Preparation:  Patient was prepped and draped in usual sterile fashion  Exploration:     Hemostasis achieved with:  Direct pressure    Wound extent: no foreign bodies/material noted      Contaminated: no    Treatment:     Area cleansed with:  Saline and povidone-iodine    Amount of cleaning:  Extensive    Irrigation solution:  Sterile saline    Visualized foreign bodies/material removed: no (Wound was examined the base in a bloodless field and no foreign bodies were identified.)      Debridement:  None  Skin repair:     Repair method:  Sutures    Suture size:  4-0    Suture material:  Nylon    Suture technique:  Simple interrupted    Number of sutures:  2  Approximation:     Approximation:  Close  Repair type:     Repair type:  Simple  Post-procedure details:     Dressing:  Splint for protection    Procedure completion:  Tolerated well, no immediate complications               ED Course      /75 (BP Location: Left arm)   Pulse 77   Temp 97.8 °F (36.6 °C) (Oral)   Resp 18   Ht 170.2 cm (67\")   Wt 79.4 kg (175 lb)   SpO2 97%   BMI 27.41 kg/m²   Labs Reviewed - No data to display  Medications   lidocaine (XYLOCAINE) 1 % injection 10 mL (10 mL Injection Given 3/15/23 0742)     No radiology results for the last day                                       MDM  Offered tetanus patient refused.  Will close.  Probed and no glass noted.  Wound closed.  Wound care instructions discussed.  No foreign bodies noted.  DC'd " home.  Final diagnoses:   Laceration of right ring finger without foreign body without damage to nail, initial encounter       ED Disposition  ED Disposition     ED Disposition   Discharge    Condition   Stable    Comment   --             Ana Balderrama, APRN  [address]  [place] [address] [phone]    In 5 days  For suture removal         Medication List      No changes were made to your prescriptions during this visit.          Josué Hernandez MD  03/15/23 6638

## 2023-09-16 ENCOUNTER — HOSPITAL ENCOUNTER (EMERGENCY)
Facility: HOSPITAL | Age: 28
Discharge: HOME | End: 2023-09-16
Attending: EMERGENCY MEDICINE
Payer: COMMERCIAL

## 2023-09-16 ENCOUNTER — APPOINTMENT (OUTPATIENT)
Dept: GENERAL RADIOLOGY | Facility: HOSPITAL | Age: 28
End: 2023-09-16
Payer: COMMERCIAL

## 2023-09-16 VITALS
TEMPERATURE: 98.2 F | WEIGHT: 173.5 LBS | HEART RATE: 72 BPM | BODY MASS INDEX: 27.23 KG/M2 | HEIGHT: 67 IN | DIASTOLIC BLOOD PRESSURE: 77 MMHG | RESPIRATION RATE: 16 BRPM | SYSTOLIC BLOOD PRESSURE: 117 MMHG | OXYGEN SATURATION: 100 %

## 2023-09-16 DIAGNOSIS — S63.611A SPRAIN OF LEFT INDEX FINGER, UNSPECIFIED SITE OF DIGIT, INITIAL ENCOUNTER: Primary | ICD-10-CM

## 2023-09-16 DIAGNOSIS — S60.222A CONTUSION OF LEFT HAND, INITIAL ENCOUNTER: ICD-10-CM

## 2023-09-16 PROCEDURE — 73130 X-RAY EXAM OF HAND: CPT

## 2023-09-16 PROCEDURE — 99283 EMERGENCY DEPT VISIT LOW MDM: CPT

## 2023-09-16 NOTE — ED PROVIDER NOTES
Subjective   History of Present Illness  Patient is a 27-year-old white male with no significant medical history who presents today with complaints of injury to his left hand and index finger.  He states 1 week ago he injured his left index finger after he got it caught in the bike spokes.  He states a couple days ago he reinjured his left hand.  He complains of pain mostly in the left index fingers its worse with movement.  No numbness tingling or weakness.    Review of Systems   Musculoskeletal:         Left hand and index finger injury     Past Medical History:   Diagnosis Date    Anxiety     Depression     History of suicidal ideation     Mid back pain     Recovering alcoholic        No Known Allergies    Past Surgical History:   Procedure Laterality Date    TONSILLECTOMY      TOOTH EXTRACTION         Family History   Problem Relation Age of Onset    Diabetes Maternal Grandfather     Hypertension Maternal Grandfather     Stroke Mother     Hypertension Mother        Social History     Socioeconomic History    Marital status: Single   Tobacco Use    Smoking status: Every Day     Packs/day: 2.00     Years: 13.00     Pack years: 26.00     Types: Cigarettes    Smokeless tobacco: Current     Types: Chew   Substance and Sexual Activity    Alcohol use: Not Currently    Drug use: Never    Sexual activity: Defer           Objective   Physical Exam  Vital signs and triage nurse note reviewed.  Constitutional: Awake, alert; well-developed and well-nourished. No acute distress is noted.  Cardiovascular: Regular rate and rhythm  Pulmonary: Respiratory effort regular nonlabored  Musculoskeletal: Independent range of motion of all extremities.  Mild swelling noted of the left index finger diffusely.  No overlying erythema or ecchymosis.  No open wounds.  No crepitus.  No gross deformity.  Good cap refill and sensation distally.  Mild pain over the dorsum of the left hand fourth and fifth metacarpal without gross deformity  erythema ecchymosis.  Neuro: Alert oriented x3, speech is clear and appropriate, GCS 15.    Skin: Flesh tone, warm, dry, intact; no erythematous or petechial rash or lesion.    Procedures           ED Course      Labs Reviewed - No data to display  XR Hand 3+ View Left    Result Date: 9/16/2023  Impression: Normal study. Electronically Signed: Bam Bonner MD  9/16/2023 11:46 AM EDT  Workstation ID: [id number]   Medications - No data to display                                       Medical Decision Making  Patient presents today with the above complaint.    He had the above exam and evaluation.  X-rays were obtained.    Work-up: My x-ray interpretation shows no fracture, radiologist reads normal study.    The patient had an aluminum finger splint applied.  Distal motor, sensory and vascular status intact after application.     Diagnosis and treatment plan discussed with patient.  Patient agreeable to plan.   I discussed findings with patient who voices understanding of discharge instructions, signs and symptoms requiring return to ED; discharged improved and in stable condition with follow up for re-evaluation.      Problems Addressed:  Contusion of left hand, initial encounter: complicated acute illness or injury  Sprain of left index finger, unspecified site of digit, initial encounter: complicated acute illness or injury    Amount and/or Complexity of Data Reviewed  Radiology: ordered.        Final diagnoses:   Sprain of left index finger, unspecified site of digit, initial encounter   Contusion of left hand, initial encounter       ED Disposition  ED Disposition       ED Disposition   Discharge    Condition   Stable    Comment   --               [facility] - [address]  [facility] [address]  [phone]             Medication List      No changes were made to your prescriptions during this visit.            Rena Sampson, APRN  09/16/23 1231

## 2023-09-16 NOTE — DISCHARGE INSTRUCTIONS
Wear splint until pain improves.  Elevate and ice over the next few days.  Tylenol or ibuprofen for pain.  Follow-up with hand specialist if your pain does not improve.  Return for new or worsening symptoms.

## 2023-12-20 ENCOUNTER — HOSPITAL ENCOUNTER (EMERGENCY)
Facility: HOSPITAL | Age: 28
Discharge: HOME | End: 2023-12-20
Attending: EMERGENCY MEDICINE | Admitting: EMERGENCY MEDICINE
Payer: COMMERCIAL

## 2023-12-20 VITALS
TEMPERATURE: 98.1 F | HEART RATE: 82 BPM | HEIGHT: 67 IN | WEIGHT: 177.03 LBS | DIASTOLIC BLOOD PRESSURE: 61 MMHG | BODY MASS INDEX: 27.79 KG/M2 | SYSTOLIC BLOOD PRESSURE: 111 MMHG | OXYGEN SATURATION: 95 % | RESPIRATION RATE: 16 BRPM

## 2023-12-20 DIAGNOSIS — J06.9 VIRAL URI WITH COUGH: Primary | ICD-10-CM

## 2023-12-20 LAB
B PARAPERT DNA SPEC QL NAA+PROBE: NOT DETECTED
B PERT DNA SPEC QL NAA+PROBE: NOT DETECTED
C PNEUM DNA NPH QL NAA+NON-PROBE: NOT DETECTED
FLUAV SUBTYP SPEC NAA+PROBE: NOT DETECTED
FLUBV RNA ISLT QL NAA+PROBE: NOT DETECTED
HADV DNA SPEC NAA+PROBE: NOT DETECTED
HCOV 229E RNA SPEC QL NAA+PROBE: NOT DETECTED
HCOV HKU1 RNA SPEC QL NAA+PROBE: NOT DETECTED
HCOV NL63 RNA SPEC QL NAA+PROBE: NOT DETECTED
HCOV OC43 RNA SPEC QL NAA+PROBE: NOT DETECTED
HMPV RNA NPH QL NAA+NON-PROBE: NOT DETECTED
HPIV1 RNA ISLT QL NAA+PROBE: NOT DETECTED
HPIV2 RNA SPEC QL NAA+PROBE: NOT DETECTED
HPIV3 RNA NPH QL NAA+PROBE: NOT DETECTED
HPIV4 P GENE NPH QL NAA+PROBE: NOT DETECTED
M PNEUMO IGG SER IA-ACNC: NOT DETECTED
RHINOVIRUS RNA SPEC NAA+PROBE: DETECTED
RSV RNA NPH QL NAA+NON-PROBE: NOT DETECTED
SARS-COV-2 RNA NPH QL NAA+NON-PROBE: NOT DETECTED

## 2023-12-20 PROCEDURE — 0202U NFCT DS 22 TRGT SARS-COV-2: CPT | Performed by: NURSE PRACTITIONER

## 2023-12-20 PROCEDURE — 99283 EMERGENCY DEPT VISIT LOW MDM: CPT

## 2023-12-20 RX ORDER — BENZONATATE 200 MG/1
200 CAPSULE ORAL 3 TIMES DAILY PRN
Qty: 15 CAPSULE | Refills: 0 | Status: SHIPPED | OUTPATIENT
Start: 2023-12-20

## 2023-12-20 NOTE — Clinical Note
[facility] EMERGENCY DEPARTMENT  [address] [place] [address]  Phone: [phone]    Davin Stokes was seen and treated in our emergency department on 12/20/2023.  He may return to work on 12/22/2023.         Thank you for choosing [facility].    Rena Sampson APRN

## 2023-12-21 NOTE — ED PROVIDER NOTES
Subjective   History of Present Illness  Patient is a 36-year-old white male with no significant medical history presents today with complaints of cough congestion sore throat chills and bodyaches.  States his symptoms started earlier today.  He reports several family members are ill with similar symptoms.  No vomiting or diarrhea.  No chest pain shortness of breath or abdominal pain.      Review of Systems   Constitutional:  Positive for fever.   HENT:  Positive for congestion, postnasal drip and rhinorrhea.    Respiratory:  Positive for cough. Negative for shortness of breath.    Cardiovascular:  Negative for chest pain.   Gastrointestinal:  Negative for abdominal pain, diarrhea and vomiting.   Musculoskeletal:  Positive for myalgias.       Past Medical History:   Diagnosis Date    Anxiety     Depression     History of suicidal ideation     Mid back pain     Recovering alcoholic        No Known Allergies    Past Surgical History:   Procedure Laterality Date    TONSILLECTOMY      TOOTH EXTRACTION         Family History   Problem Relation Age of Onset    Diabetes Maternal Grandfather     Hypertension Maternal Grandfather     Stroke Mother     Hypertension Mother        Social History     Socioeconomic History    Marital status: Single   Tobacco Use    Smoking status: Every Day     Packs/day: 2.00     Years: 13.00     Additional pack years: 0.00     Total pack years: 26.00     Types: Cigarettes    Smokeless tobacco: Current     Types: Chew   Substance and Sexual Activity    Alcohol use: Not Currently    Drug use: Never    Sexual activity: Defer           Objective   Physical Exam  Vital signs and triage nurse note reviewed.  Constitutional: Awake, alert; well-developed and well-nourished. No acute distress is noted.  HEENT: Normocephalic, atraumatic; pupils are PERRL with intact EOM; oropharynx is pink and moist without exudate or erythema.  No drooling or pooling of oral secretions.  Neck: Supple, full range of  motion without pain; no cervical lymphadenopathy. Normal phonation.  Cardiovascular: Regular rate and rhythm, normal S1-S2.  No murmur noted.  Pulmonary: Respiratory effort regular nonlabored, breath sounds clear to auscultation all fields.  Abdomen: Soft, nontender, nondistended with normoactive bowel sounds; no rebound or guarding.  Musculoskeletal: Independent range of motion of all extremities with no palpable tenderness or edema.  Neuro: Alert oriented x3, speech is clear and appropriate, GCS 15.    Skin: Flesh tone, warm, dry, intact; no erythematous or petechial rash or lesion.    Procedures           ED Course      Labs Reviewed   RESPIRATORY PANEL PCR W/ COVID-19 (SARS-COV-2), NP SWAB IN UTM/VTP, 2 HR TAT - Abnormal; Notable for the following components:       Result Value    Human Rhinovirus/Enterovirus Detected (*)     All other components within normal limits    Narrative:     In the setting of a positive respiratory panel with a viral infection PLUS a negative procalcitonin without other underlying concern for bacterial infection, consider observing off antibiotics or discontinuation of antibiotics and continue supportive care. If the respiratory panel is positive for atypical bacterial infection (Bordetella pertussis, Chlamydophila pneumoniae, or Mycoplasma pneumoniae), consider antibiotic de-escalation to target atypical bacterial infection.     No radiology results for the last day  Medications - No data to display                                         Medical Decision Making  Patient presents today with the above complaint.  He had the above exam evaluation.  Nasal swab was obtained and found to be positive for rhinovirus.  On reexamination patient resting quietly and in no distress.  He is no new complaints.  He remains well-appearing and hemodynamically stable.  Findings were discussed with him.  He be discharged home instructed follow-up with primary care provider as needed but was given  warning signs for prompt return to the ED and voiced understanding.        Final diagnoses:   Viral URI with cough       ED Disposition  ED Disposition       ED Disposition   Discharge    Condition   Stable    Comment   --               PATIENT CONNECTION - [place] [id number] [phone]    As needed         Medication List        New Prescriptions      benzonatate 200 MG capsule  Commonly known as: TESSALON  Take 1 capsule by mouth 3 (Three) Times a Day As Needed for Cough.               Where to Get Your Medications        These medications were sent to [facility]/pharmacy #[id number] - [place], [place] - [address] [place] [address] - [phone]  - [phone] [address] [place] [address]      Hours: 24-hours Phone: [phone]   benzonatate 200 MG capsule            Rena Sampson APRN  12/20/23 5434

## 2023-12-21 NOTE — DISCHARGE INSTRUCTIONS
Take Tessalon as prescribed for cough.  Symptomatic treatment.  Tylenol and ibuprofen for pain or fever.  Mucinex for congestion.  Drink plenty of fluids.  Follow-up with primary care provider as needed.  Return for new or worsening symptoms.

## 2025-04-28 ENCOUNTER — APPOINTMENT (OUTPATIENT)
Dept: GENERAL RADIOLOGY | Facility: HOSPITAL | Age: 30
End: 2025-04-28

## 2025-04-28 ENCOUNTER — HOSPITAL ENCOUNTER (OUTPATIENT)
Facility: HOSPITAL | Age: 30
Discharge: HOME | End: 2025-04-28
Attending: EMERGENCY MEDICINE | Admitting: EMERGENCY MEDICINE

## 2025-04-28 VITALS
BODY MASS INDEX: 27.97 KG/M2 | DIASTOLIC BLOOD PRESSURE: 81 MMHG | OXYGEN SATURATION: 98 % | RESPIRATION RATE: 17 BRPM | WEIGHT: 178.2 LBS | TEMPERATURE: 98.5 F | SYSTOLIC BLOOD PRESSURE: 120 MMHG | HEIGHT: 67 IN | HEART RATE: 85 BPM

## 2025-04-28 DIAGNOSIS — M25.562 ACUTE PAIN OF LEFT KNEE: Primary | ICD-10-CM

## 2025-04-28 PROCEDURE — 73562 X-RAY EXAM OF KNEE 3: CPT

## 2025-04-28 PROCEDURE — G0463 HOSPITAL OUTPT CLINIC VISIT: HCPCS

## 2025-04-28 RX ORDER — PREDNISONE 20 MG/1
20 TABLET ORAL DAILY
Qty: 5 TABLET | Refills: 0 | Status: SHIPPED | OUTPATIENT
Start: 2025-04-28 | End: 2025-05-03

## 2025-04-28 NOTE — Clinical Note
[facility] [place] [address] [place] [address] [place] [address]  Phone: [phone]    Davin Stoeks was seen and treated in our emergency department on 4/28/2025.  He may return to work on 05/01/2025.         Thank you for choosing [facility].    Samantha Canada RN

## 2025-04-28 NOTE — Clinical Note
[facility] [place] [id number] [place] [address] [place] [address]  Phone: [phone]    Davin Stokes was seen and treated in our emergency department on 4/28/2025.  He may return to work on 05/01/2025.         Thank you for choosing [facility].    Heather Nur APRN

## 2025-04-28 NOTE — FSED PROVIDER NOTE
"     [facility]STANDING ED / URGENT CARE    EMERGENCY DEPARTMENT ENCOUNTER    Room Number:  10/10  Date seen:  4/28/2025  Time seen: 12:41 EDT  PCP: Provider, No Known  Historian: Patient    HPI:  Chief complaint: Knee pain  Context:Davin Stokes is a 29 y.o. male who presents to the ED with c/o knee pain.  Patient reports that he has been having knee pain since Saturday.  He reports that he was at [facility] when he felt \"a pop\".  He reports that he was walking when the pop occurred.  He reports that he does have his knees and has knee brace at home that he wears intermittently.  Patient denies any numbness, tingling.  He is able to ambulate without difficulty.  Patient reports that he is a  so getting up and down does make the pain worse.  Patient is nontoxic in appearance    Timing: Constant  Duration: Saturday  Location: Left knee  Intensity/Severity: Moderate  Associated Symptoms: Left knee pain      MEDICAL RECORD REVIEW  Depression, anxiety    ALLERGIES  Patient has no known allergies.    PAST MEDICAL HISTORY  Active Ambulatory Problems     Diagnosis Date Noted    Closed fracture of tenth thoracic vertebra with routine healing 06/01/2020     Resolved Ambulatory Problems     Diagnosis Date Noted    No Resolved Ambulatory Problems     Past Medical History:   Diagnosis Date    Anxiety     Depression     History of suicidal ideation     Mid back pain     Recovering alcoholic        PAST SURGICAL HISTORY  Past Surgical History:   Procedure Laterality Date    TONSILLECTOMY      TOOTH EXTRACTION         FAMILY HISTORY  Family History   Problem Relation Age of Onset    Diabetes Maternal Grandfather     Hypertension Maternal Grandfather     Stroke Mother     Hypertension Mother        SOCIAL HISTORY  Social History     Socioeconomic History    Marital status: Single   Tobacco Use    Smoking status: Every Day     Current packs/day: 2.00     Average packs/day: 2.0 packs/day for 13.0 years (26.0 ttl " pk-yrs)     Types: Cigarettes    Smokeless tobacco: Current     Types: Chew   Substance and Sexual Activity    Alcohol use: Not Currently    Drug use: Never    Sexual activity: Defer       REVIEW OF SYSTEMS  Review of Systems    All systems reviewed and negative except for those discussed in HPI.     PHYSICAL EXAM    I have reviewed the triage vital signs and nursing notes.    ED Triage Vitals [04/28/25 1051]   Temp Heart Rate Resp BP SpO2   98.5 °F (36.9 °C) 85 17 120/81 98 %      Temp src Heart Rate Source Patient Position BP Location FiO2 (%)   Oral Monitor Sitting Right arm --       Physical Exam  Constitutional:       Appearance: Normal appearance. He is not toxic-appearing.   HENT:      Nose: Nose normal.      Mouth/Throat:      Mouth: Mucous membranes are moist.   Eyes:      Pupils: Pupils are equal, round, and reactive to light.   Cardiovascular:      Rate and Rhythm: Normal rate.      Pulses: Normal pulses.   Pulmonary:      Effort: Pulmonary effort is normal.   Musculoskeletal:      Left knee: No swelling or bony tenderness. Normal range of motion. Tenderness present. Normal pulse.        Legs:    Skin:     General: Skin is warm.      Capillary Refill: Capillary refill takes less than 2 seconds.   Neurological:      General: No focal deficit present.      Mental Status: He is alert.   Psychiatric:         Mood and Affect: Mood normal.         Behavior: Behavior normal.         Vital signs and nursing notes reviewed.        LAB RESULTS  No results found for this or any previous visit (from the past 24 hours).    Ordered the above labs and independently reviewed the results.      RADIOLOGY RESULTS  XR Knee 3 View Left  Result Date: 4/28/2025  XR KNEE 3 VW LEFT Date of Exam: 4/28/2025 12:00 PM EDT Indication: knee cap pain Comparison: None available. Findings: No left knee fracture or joint malalignment or joint effusion. The joint spaces appear preserved without spurring or significant osteoarthritic change.  No osteochondral defects. No retained opaque foreign body     Impression: Normal left knee series. Electronically Signed: Vilma Gore MD  4/28/2025 12:10 PM EDT  Workstation ID: [id number]         I ordered the above noted radiological studies. Independently reviewed by me and discussed with radiologist.  See dictation above for official radiology interpretation.      Orders placed during this visit:  Orders Placed This Encounter   Procedures    [facility] 06. Hinged Knee (); Left    XR Knee 3 View Left           PROCEDURES    Procedures        MEDICATIONS GIVEN IN ER    Medications - No data to display      PROGRESS, DATA ANALYSIS, CONSULTS, AND MEDICAL DECISION MAKING    All labs and radiology studies have been independently reviewed by me.          AS OF 12:43 EDT VITALS:    BP - 120/81  HR - 85  TEMP - 98.5 °F (36.9 °C) (Oral)  02 SATS - 98%    Medical Decision Making  Patient is a 29-year-old male who presents today with left knee pain.  X-rays were obtained to evaluate for acute osseous abnormality.  I have low suspicion for fracture, dislocation, significant ligamentous injury, septic arthritis, gout flare, new autoimmune arthropathy, or gonococcal arthropathy.  Patient will be sent home with a prescription for Voltaren gel, prednisone.  Recommend follow-up with orthopedics.  Patient was given a knee brace.  He was given return precautions with understanding.    Problems Addressed:  Acute pain of left knee: complicated acute illness or injury    Amount and/or Complexity of Data Reviewed  Radiology: ordered.    Risk  Prescription drug management.          DIAGNOSIS  Final diagnoses:   Acute pain of left knee       New Medications Ordered This Visit   Medications    Diclofenac Sodium (VOLTAREN) 1 % gel gel     Sig: Apply 4 g topically to the appropriate area as directed 4 (Four) Times a Day As Needed (pain).     Dispense:  50 g     Refill:  0    predniSONE (DELTASONE) 20 MG tablet     Sig: Take 1  tablet by mouth Daily for 5 days.     Dispense:  5 tablet     Refill:  0           I performed hand hygiene on entry into the pt room and upon exit.      Part of this note may be an electronic transcription/translation of spoken language to printed text using the Dragon Dictation System.     Appropriate PPE worn during exam.    Dictated utilizing [employer] dictation     Note Disclaimer: At [facility], we believe that sharing information builds trust and better relationships. You are receiving this note because you recently visited [facility]. It is possible you will see health information before a provider has talked with you about it. This kind of information can be easy to misunderstand. To help you fully understand what it means for your health, we urge you to discuss this note with your provider.

## 2025-04-28 NOTE — DISCHARGE INSTRUCTIONS
Take the prednisone as prescribed for the next 5 days.  You can apply the diclofenac gel to the sore area 4 times a day as needed for pain.  You can use Tylenol as needed for discomfort.  Refrain from any ibuprofen, Motrin, Advil, Aleve until you finish the prednisone.  Rest, ice, elevate.  You can apply ice for 20 minutes at a time.  Wear the knee brace as needed.  Call the patient care connection to establish primary care provider.  You can also follow-up with orthopedic doctor listed below for continued evaluation.  Return the emergency room for any new or worsening symptoms.

## 2025-04-28 NOTE — Clinical Note
[facility] [place] [address] [place] [address] [place] [address]  Phone: [phone]    Davin Stokes was seen and treated in our emergency department on 4/28/2025.  He may return to work on 05/01/2025.         Thank you for choosing [facility].    Samantha Canada RN